# Patient Record
Sex: FEMALE | Race: WHITE | NOT HISPANIC OR LATINO | Employment: STUDENT | URBAN - METROPOLITAN AREA
[De-identification: names, ages, dates, MRNs, and addresses within clinical notes are randomized per-mention and may not be internally consistent; named-entity substitution may affect disease eponyms.]

---

## 2023-07-27 ENCOUNTER — OFFICE VISIT (OUTPATIENT)
Dept: URGENT CARE | Facility: URGENT CARE | Age: 23
End: 2023-07-27
Payer: COMMERCIAL

## 2023-07-27 VITALS
SYSTOLIC BLOOD PRESSURE: 121 MMHG | DIASTOLIC BLOOD PRESSURE: 73 MMHG | WEIGHT: 128.2 LBS | OXYGEN SATURATION: 100 % | TEMPERATURE: 97.6 F | HEART RATE: 82 BPM

## 2023-07-27 DIAGNOSIS — A69.20 ERYTHEMA MIGRANS (LYME DISEASE): Primary | ICD-10-CM

## 2023-07-27 DIAGNOSIS — L02.419 LEG ABSCESS: ICD-10-CM

## 2023-07-27 PROCEDURE — 99203 OFFICE O/P NEW LOW 30 MIN: CPT | Performed by: NURSE PRACTITIONER

## 2023-07-27 RX ORDER — DOXYCYCLINE HYCLATE 100 MG
100 TABLET ORAL 2 TIMES DAILY
Qty: 20 TABLET | Refills: 0 | Status: SHIPPED | OUTPATIENT
Start: 2023-07-27 | End: 2023-08-06

## 2023-07-27 RX ORDER — DOXYCYCLINE HYCLATE 100 MG
100 TABLET ORAL 2 TIMES DAILY
Qty: 20 TABLET | Refills: 0 | Status: SHIPPED | OUTPATIENT
Start: 2023-07-27 | End: 2023-07-27

## 2023-07-27 RX ORDER — ONDANSETRON 4 MG/1
4 TABLET, ORALLY DISINTEGRATING ORAL EVERY 8 HOURS PRN
COMMUNITY
Start: 2021-09-02

## 2023-07-27 RX ORDER — ADAPALENE 45 G/G
GEL TOPICAL AT BEDTIME
COMMUNITY

## 2023-07-28 NOTE — PROGRESS NOTES
Assessment & Plan     Erythema migrans (Lyme disease)    - doxycycline hyclate (VIBRA-TABS) 100 MG tablet  Dispense: 20 tablet; Refill: 0    Leg abscess       Will treat with Lyme disease with doxycycline twice daily for 10 days which would also cover for cellulitis with abscess, avoid sun while taking. Keep skin clean and dry. Watch closely for worsening symptoms of infection including fever, chills, redness, swelling, pain, purulent discharge and follow-up right away if develops. Avoid scratching.     Follow-up with PCP if symptoms persist for 5 days, and sooner if symptoms worsen or new symptoms develop.     Discussed red flag symptoms which warrant immediate visit in emergency room    All questions were answered and patient verbalized understanding. AVS reviewed with patient.     Radha Zuleta, DNP, APRN, CNP 7/27/2023 7:45 PM  [facility] [place]        Brian Whyte is a 23 year old female who presents to clinic today with her boyfriend for the following health issues:  Chief Complaint   Patient presents with    Tick Bite     Rt leg, calf area. Noticed today but was out picnicking Tuesday so thinks happened then, never saw a tick. Mildly itchy, warm to the touch, bullseye marking.      Patient presents for evaluation of possible tick bite. No known tick bite though her dogs often have ticks. She noticed a red bullseye rash on right lower leg today which has been stable with mild swelling and itching. Denies drainage, fever, neck stiffness, joint pain. Associated symptoms: headache earlier today. She took motrin which helps temporarily. She has a history of lyme disease when she was about 5-6 years old     Problem list, Medication list, Allergies, and Medical history reviewed in EPIC.    ROS:  Review of systems negative except for noted above        Objective    /73 (BP Location: Right arm, Cuff Size: Adult Small)   Pulse 82   Temp 97.6  F (36.4  C) (Tympanic)   Wt 58.2 kg  (128 lb 3.2 oz)   SpO2 100%   Physical Exam  Constitutional:       General: She is not in acute distress.     Appearance: She is not toxic-appearing or diaphoretic.   Cardiovascular:      Rate and Rhythm: Normal rate and regular rhythm.      Heart sounds: Normal heart sounds.   Pulmonary:      Effort: Pulmonary effort is normal. No respiratory distress.      Breath sounds: Normal breath sounds. No wheezing, rhonchi or rales.   Musculoskeletal:      Cervical back: Normal range of motion. No rigidity.   Lymphadenopathy:      Cervical: No cervical adenopathy.   Skin:     General: Skin is warm and dry.      Findings: Erythema present.      Comments: Approx 1 cm erythema with 1 cm firm abscess and moderate swelling with bullseye rash surrounding approx 3 cm without increased warmth   Neurological:      Mental Status: She is alert.      Sensory: No sensory deficit.              Verbal consent obtained from patient to take photo for medical electronic health record